# Patient Record
Sex: MALE | Race: BLACK OR AFRICAN AMERICAN | NOT HISPANIC OR LATINO | Employment: FULL TIME | URBAN - METROPOLITAN AREA
[De-identification: names, ages, dates, MRNs, and addresses within clinical notes are randomized per-mention and may not be internally consistent; named-entity substitution may affect disease eponyms.]

---

## 2018-10-25 ENCOUNTER — HOSPITAL ENCOUNTER (EMERGENCY)
Facility: HOSPITAL | Age: 23
Discharge: HOME | End: 2018-10-26
Attending: EMERGENCY MEDICINE | Admitting: NURSE PRACTITIONER

## 2018-10-25 VITALS
HEART RATE: 116 BPM | DIASTOLIC BLOOD PRESSURE: 82 MMHG | TEMPERATURE: 98 F | WEIGHT: 150 LBS | RESPIRATION RATE: 20 BRPM | SYSTOLIC BLOOD PRESSURE: 150 MMHG | OXYGEN SATURATION: 98 % | HEIGHT: 72 IN | BODY MASS INDEX: 20.32 KG/M2

## 2018-10-25 DIAGNOSIS — S61.012A LACERATION OF LEFT THUMB WITHOUT FOREIGN BODY WITHOUT DAMAGE TO NAIL, INITIAL ENCOUNTER: Primary | ICD-10-CM

## 2018-10-25 PROCEDURE — 99282 EMERGENCY DEPT VISIT SF MDM: CPT

## 2018-10-25 RX ORDER — BUPIVACAINE HYDROCHLORIDE 2.5 MG/ML
10 INJECTION, SOLUTION EPIDURAL; INFILTRATION; INTRACAUDAL ONCE
Status: DISCONTINUED | OUTPATIENT
Start: 2018-10-25 | End: 2018-10-26 | Stop reason: HOSPADM

## 2018-10-25 RX ORDER — LIDOCAINE HYDROCHLORIDE 10 MG/ML
5 INJECTION, SOLUTION EPIDURAL; INFILTRATION; INTRACAUDAL; PERINEURAL ONCE
Status: DISCONTINUED | OUTPATIENT
Start: 2018-10-25 | End: 2018-10-26 | Stop reason: HOSPADM

## 2018-10-26 PROCEDURE — 90715 TDAP VACCINE 7 YRS/> IM: CPT | Performed by: NURSE PRACTITIONER

## 2018-10-26 PROCEDURE — 90471 IMMUNIZATION ADMIN: CPT | Performed by: NURSE PRACTITIONER

## 2018-10-26 PROCEDURE — 25010000002 TDAP 5-2.5-18.5 LF-MCG/0.5 SUSPENSION: Performed by: NURSE PRACTITIONER

## 2018-10-26 NOTE — ED PROVIDER NOTES
Subjective   Win Matthew is a 22 yr old male that presents to the ER with c/o lt thumb laceration. Pt was cutting cucumbers with a knife and caught the the top of his lt thumb. Pt has an ~ 1 cm laceration. Nailbed is intact.  Pt has +ROM.  Sensation is intact.         Laceration       Review of Systems   Musculoskeletal:        Lt thumb pain   Skin: Positive for wound (lt thumb laceration).   Neurological: Negative for weakness and numbness.   All other systems reviewed and are negative.      History reviewed. No pertinent past medical history.    No Known Allergies    No past surgical history on file.    History reviewed. No pertinent family history.    Social History     Social History   • Marital status: Single     Social History Main Topics   • Drug use: Unknown     Other Topics Concern   • Not on file           Objective   Physical Exam   Constitutional: He is oriented to person, place, and time. He appears well-developed and well-nourished.   Pt is sitting up in the bed. Pt Is anxious and uncomfortable.    HENT:   Head: Normocephalic and atraumatic.   Eyes: Conjunctivae and EOM are normal.   Neck: Normal range of motion.   Cardiovascular: Normal rate, regular rhythm and intact distal pulses.    Pulmonary/Chest: Breath sounds normal. No respiratory distress.   Musculoskeletal:        Left hand: He exhibits normal range of motion and normal capillary refill.        Hands:  Neurological: He is alert and oriented to person, place, and time.   Skin: Skin is warm and dry.   LT thumb laceration:  ~ 1 cm u-shaped laceration to the medial distal portion of the thumb.  Nailbed is intact.  +ROM, bleeding is controlled.  Sensation intact.    Nursing note and vitals reviewed.      Laceration Repair  Date/Time: 10/26/2018 12:30 AM  Performed by: ELMER VAZQUEZ  Authorized by: TOBIAS GUY     Consent:     Consent obtained:  Verbal    Consent given by:  Patient    Risks discussed:  Infection and poor wound  healing    Alternatives discussed:  No treatment  Anesthesia (see MAR for exact dosages):     Anesthesia method:  Nerve block    Block needle gauge:  25 G    Block anesthetic:  Lidocaine 1% w/o epi and bupivacaine 0.25% w/o epi    Block injection procedure:  Anatomic landmarks identified, anatomic landmarks palpated and introduced needle    Block outcome:  Anesthesia achieved  Laceration details:     Location: lt thumb     Length (cm):  1  Repair type:     Repair type:  Simple  Pre-procedure details:     Preparation:  Patient was prepped and draped in usual sterile fashion  Exploration:     Wound extent: no tendon damage noted and no vascular damage noted      Contaminated: no    Treatment:     Area cleansed with:  Saline and Betadine    Amount of cleaning:  Standard    Irrigation solution:  Sterile saline    Irrigation method:  Syringe  Skin repair:     Repair method:  Sutures    Suture size:  5-0    Suture material:  Prolene    Suture technique:  Simple interrupted    Number of sutures:  2  Approximation:     Approximation:  Close    Vermilion border: well-aligned    Post-procedure details:     Dressing:  Open (no dressing)    Patient tolerance of procedure:  Tolerated well, no immediate complications               ED Course  ED Course as of Oct 26 0422   Fri Oct 26, 2018   0030 Pt tolerated procedure well.  Patient will be discharged home.  I discussed signs and symptoms of infection with the patient.  Patient to have sutures removed in 10 days.  Patient agrees and verbalizes understanding.  [KG]      ED Course User Index  [KG] Denise Newton, APRN        No results found for this or any previous visit (from the past 24 hour(s)).  Note: In addition to lab results from this visit, the labs listed above may include labs taken at another facility or during a different encounter within the last 24 hours. Please correlate lab times with ED admission and discharge times for further clarification of the services  "performed during this visit.    No orders to display     Vitals:    10/25/18 2232   BP: 150/82   Pulse: 116   Resp: 20   Temp: 98 °F (36.7 °C)   TempSrc: Oral   SpO2: 98%   Weight: 68 kg (150 lb)   Height: 182.9 cm (72\")     Medications - No data to display  ECG/EMG Results (last 24 hours)     ** No results found for the last 24 hours. **                  [facility]      Final diagnoses:   Laceration of left thumb without foreign body without damage to nail, initial encounter            Denise Newton, APRN  10/26/18 0422    "

## 2018-10-26 NOTE — DISCHARGE INSTRUCTIONS
Stitches out in 10 days.  Watch for signs of infection.  Watch for swelling, yellow drainage to the site.